# Patient Record
Sex: MALE
[De-identification: names, ages, dates, MRNs, and addresses within clinical notes are randomized per-mention and may not be internally consistent; named-entity substitution may affect disease eponyms.]

---

## 2018-04-09 ENCOUNTER — APPOINTMENT (OUTPATIENT)
Dept: SURGICAL ONCOLOGY | Facility: CLINIC | Age: 83
End: 2018-04-09
Payer: MEDICARE

## 2018-04-09 VITALS
HEIGHT: 72 IN | SYSTOLIC BLOOD PRESSURE: 139 MMHG | DIASTOLIC BLOOD PRESSURE: 68 MMHG | BODY MASS INDEX: 26.95 KG/M2 | OXYGEN SATURATION: 92 % | WEIGHT: 199 LBS | HEART RATE: 80 BPM

## 2018-04-09 DIAGNOSIS — Z86.79 PERSONAL HISTORY OF OTHER DISEASES OF THE CIRCULATORY SYSTEM: ICD-10-CM

## 2018-04-09 DIAGNOSIS — Z85.828 PERSONAL HISTORY OF OTHER MALIGNANT NEOPLASM OF SKIN: ICD-10-CM

## 2018-04-09 DIAGNOSIS — Z87.438 PERSONAL HISTORY OF OTHER DISEASES OF MALE GENITAL ORGANS: ICD-10-CM

## 2018-04-09 DIAGNOSIS — C44.729 SQUAMOUS CELL CARCINOMA OF SKIN OF LEFT LOWER LIMB, INCLUDING HIP: ICD-10-CM

## 2018-04-09 DIAGNOSIS — Z87.442 PERSONAL HISTORY OF URINARY CALCULI: ICD-10-CM

## 2018-04-09 DIAGNOSIS — Z87.19 PERSONAL HISTORY OF OTHER DISEASES OF THE DIGESTIVE SYSTEM: ICD-10-CM

## 2018-04-09 DIAGNOSIS — Z95.1 PRESENCE OF AORTOCORONARY BYPASS GRAFT: ICD-10-CM

## 2018-04-09 PROCEDURE — 99204 OFFICE O/P NEW MOD 45 MIN: CPT

## 2018-04-13 ENCOUNTER — RESULT REVIEW (OUTPATIENT)
Age: 83
End: 2018-04-13

## 2018-05-03 ENCOUNTER — OUTPATIENT (OUTPATIENT)
Dept: OUTPATIENT SERVICES | Facility: HOSPITAL | Age: 83
LOS: 1 days | End: 2018-05-03
Payer: MEDICARE

## 2018-05-03 VITALS
OXYGEN SATURATION: 95 % | HEART RATE: 80 BPM | WEIGHT: 195.11 LBS | HEIGHT: 68 IN | TEMPERATURE: 97 F | SYSTOLIC BLOOD PRESSURE: 104 MMHG | RESPIRATION RATE: 16 BRPM | DIASTOLIC BLOOD PRESSURE: 56 MMHG

## 2018-05-03 DIAGNOSIS — C44.92 SQUAMOUS CELL CARCINOMA OF SKIN, UNSPECIFIED: ICD-10-CM

## 2018-05-03 DIAGNOSIS — C44.729 SQUAMOUS CELL CARCINOMA OF SKIN OF LEFT LOWER LIMB, INCLUDING HIP: ICD-10-CM

## 2018-05-03 DIAGNOSIS — Z95.0 PRESENCE OF CARDIAC PACEMAKER: Chronic | ICD-10-CM

## 2018-05-03 DIAGNOSIS — Z98.890 OTHER SPECIFIED POSTPROCEDURAL STATES: Chronic | ICD-10-CM

## 2018-05-03 DIAGNOSIS — Z95.810 PRESENCE OF AUTOMATIC (IMPLANTABLE) CARDIAC DEFIBRILLATOR: ICD-10-CM

## 2018-05-03 DIAGNOSIS — I10 ESSENTIAL (PRIMARY) HYPERTENSION: ICD-10-CM

## 2018-05-03 DIAGNOSIS — Z88.2 ALLERGY STATUS TO SULFONAMIDES: ICD-10-CM

## 2018-05-03 DIAGNOSIS — Z91.040 LATEX ALLERGY STATUS: ICD-10-CM

## 2018-05-03 DIAGNOSIS — Z95.1 PRESENCE OF AORTOCORONARY BYPASS GRAFT: Chronic | ICD-10-CM

## 2018-05-03 DIAGNOSIS — Z95.5 PRESENCE OF CORONARY ANGIOPLASTY IMPLANT AND GRAFT: Chronic | ICD-10-CM

## 2018-05-03 DIAGNOSIS — E11.9 TYPE 2 DIABETES MELLITUS WITHOUT COMPLICATIONS: ICD-10-CM

## 2018-05-03 DIAGNOSIS — Z95.810 PRESENCE OF AUTOMATIC (IMPLANTABLE) CARDIAC DEFIBRILLATOR: Chronic | ICD-10-CM

## 2018-05-03 DIAGNOSIS — I25.10 ATHEROSCLEROTIC HEART DISEASE OF NATIVE CORONARY ARTERY WITHOUT ANGINA PECTORIS: ICD-10-CM

## 2018-05-03 LAB
ALBUMIN SERPL ELPH-MCNC: 3.9 G/DL — SIGNIFICANT CHANGE UP (ref 3.3–5)
ALP SERPL-CCNC: 67 U/L — SIGNIFICANT CHANGE UP (ref 40–120)
ALT FLD-CCNC: 8 U/L — SIGNIFICANT CHANGE UP (ref 4–41)
AST SERPL-CCNC: 16 U/L — SIGNIFICANT CHANGE UP (ref 4–40)
BILIRUB SERPL-MCNC: 0.5 MG/DL — SIGNIFICANT CHANGE UP (ref 0.2–1.2)
BLD GP AB SCN SERPL QL: NEGATIVE — SIGNIFICANT CHANGE UP
BUN SERPL-MCNC: 25 MG/DL — HIGH (ref 7–23)
CALCIUM SERPL-MCNC: 9 MG/DL — SIGNIFICANT CHANGE UP (ref 8.4–10.5)
CHLORIDE SERPL-SCNC: 100 MMOL/L — SIGNIFICANT CHANGE UP (ref 98–107)
CO2 SERPL-SCNC: 28 MMOL/L — SIGNIFICANT CHANGE UP (ref 22–31)
CREAT SERPL-MCNC: 1.08 MG/DL — SIGNIFICANT CHANGE UP (ref 0.5–1.3)
GLUCOSE SERPL-MCNC: 98 MG/DL — SIGNIFICANT CHANGE UP (ref 70–99)
HBA1C BLD-MCNC: 5.4 % — SIGNIFICANT CHANGE UP (ref 4–5.6)
HCT VFR BLD CALC: 42.2 % — SIGNIFICANT CHANGE UP (ref 39–50)
HGB BLD-MCNC: 13.3 G/DL — SIGNIFICANT CHANGE UP (ref 13–17)
LDH SERPL L TO P-CCNC: 184 U/L — SIGNIFICANT CHANGE UP (ref 135–225)
MCHC RBC-ENTMCNC: 31.5 % — LOW (ref 32–36)
MCHC RBC-ENTMCNC: 33 PG — SIGNIFICANT CHANGE UP (ref 27–34)
MCV RBC AUTO: 104.7 FL — HIGH (ref 80–100)
NRBC # FLD: 0 — SIGNIFICANT CHANGE UP
PLATELET # BLD AUTO: 191 K/UL — SIGNIFICANT CHANGE UP (ref 150–400)
PMV BLD: 11.1 FL — SIGNIFICANT CHANGE UP (ref 7–13)
POTASSIUM SERPL-MCNC: 3.9 MMOL/L — SIGNIFICANT CHANGE UP (ref 3.5–5.3)
POTASSIUM SERPL-SCNC: 3.9 MMOL/L — SIGNIFICANT CHANGE UP (ref 3.5–5.3)
PROT SERPL-MCNC: 7.3 G/DL — SIGNIFICANT CHANGE UP (ref 6–8.3)
RBC # BLD: 4.03 M/UL — LOW (ref 4.2–5.8)
RBC # FLD: 14 % — SIGNIFICANT CHANGE UP (ref 10.3–14.5)
RH IG SCN BLD-IMP: POSITIVE — SIGNIFICANT CHANGE UP
SODIUM SERPL-SCNC: 142 MMOL/L — SIGNIFICANT CHANGE UP (ref 135–145)
WBC # BLD: 7 K/UL — SIGNIFICANT CHANGE UP (ref 3.8–10.5)
WBC # FLD AUTO: 7 K/UL — SIGNIFICANT CHANGE UP (ref 3.8–10.5)

## 2018-05-03 PROCEDURE — 71046 X-RAY EXAM CHEST 2 VIEWS: CPT | Mod: 26

## 2018-05-03 PROCEDURE — 93010 ELECTROCARDIOGRAM REPORT: CPT

## 2018-05-03 RX ORDER — SODIUM CHLORIDE 9 MG/ML
1000 INJECTION, SOLUTION INTRAVENOUS
Qty: 0 | Refills: 0 | Status: DISCONTINUED | OUTPATIENT
Start: 2018-05-15 | End: 2018-05-30

## 2018-05-03 NOTE — H&P PST ADULT - HISTORY OF PRESENT ILLNESS
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 Pt s/p biopsy requiring further treatment. 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

## 2018-05-03 NOTE — H&P PST ADULT - NEGATIVE GENERAL GENITOURINARY SYMPTOMS
no dysuria/no incontinence/normal urinary frequency/no hematuria/no renal colic/no bladder infections

## 2018-05-03 NOTE — H&P PST ADULT - ITE SK HX ROS MEA POS PC
dryness/squamous cell carcinoma to left thigh, dryness/change in size/color of mole/squamous cell carcinoma to left thigh,

## 2018-05-03 NOTE — H&P PST ADULT - PROBLEM SELECTOR PLAN 5
Pt does not have card. Called cardiology office to obtain information. Asked to mail card to patient. OR booking notified of AICD.

## 2018-05-03 NOTE — H&P PST ADULT - PMH
CAD (coronary atherosclerotic disease)    Calculus of ureter  s/p cystoscopy lithotripsy 2015  HTN (hypertension)    Hyperlipidemia    Prostate cancer  s/p surgery  Squamous cell carcinoma  left thigh  Type 2 diabetes mellitus

## 2018-05-03 NOTE — H&P PST ADULT - PROBLEM SELECTOR PLAN 2
stented coronary artery, does not have card. Instructed to continue aspirin stented coronary artery, does not have card. Instructed to continue aspirin.

## 2018-05-03 NOTE — H&P PST ADULT - RS GEN PE MLT RESP DETAILS PC
no wheezes/respirations non-labored/airway patent/no rhonchi/clear to auscultation bilaterally/no intercostal retractions/good air movement/breath sounds equal/no rales

## 2018-05-03 NOTE — H&P PST ADULT - PROBLEM SELECTOR PLAN 4
instructed to avoid Tradjenta on the morning of procedure instructed to avoid Tradjenta on the morning of procedure. OR booking notified of Hx of DM type 2

## 2018-05-03 NOTE — H&P PST ADULT - SKIN
detailed exam warm and dry/scattered to forearms and lower legs, skin dry flaky/macules/color normal

## 2018-05-03 NOTE — H&P PST ADULT - NEGATIVE NEUROLOGICAL SYMPTOMS
no weakness/no generalized seizures/no focal seizures/no syncope/no paresthesias/no transient paralysis/no headache/no tremors/no vertigo/no loss of sensation

## 2018-05-03 NOTE — H&P PST ADULT - NEGATIVE OPHTHALMOLOGIC SYMPTOMS
no photophobia/no blurred vision R/no pain L/no loss of vision L/no blurred vision L/no diplopia/no loss of vision R/no pain R

## 2018-05-03 NOTE — H&P PST ADULT - ATTENDING COMMENTS
84-year-old man with squamous cell carcinoma of the left thigh scheduled for appropriate resection, with reconstruction by Dr. Phan.    Diagnosis and planned treatment were reviewed with him in the office and again on the day of operation.    All questions answered.    Consent on chart

## 2018-05-03 NOTE — H&P PST ADULT - MUSCULOSKELETAL
details… detailed exam ROM intact/no joint warmth/normal strength/no calf tenderness/no joint swelling/no joint erythema

## 2018-05-03 NOTE — H&P PST ADULT - NEGATIVE GASTROINTESTINAL SYMPTOMS
no melena/no constipation/no change in bowel habits/no nausea/no vomiting/no diarrhea/no abdominal pain

## 2018-05-03 NOTE — H&P PST ADULT - NEGATIVE GENERAL SYMPTOMS
no malaise/no fatigue/no weight gain/no polyphagia/no polyuria/no polydipsia/no fever/no chills/no sweating/no anorexia/no weight loss

## 2018-05-03 NOTE — H&P PST ADULT - NEGATIVE CARDIOVASCULAR SYMPTOMS
no orthopnea/no palpitations/no dyspnea on exertion/no peripheral edema/no paroxysmal nocturnal dyspnea/no claudication/no chest pain

## 2018-05-03 NOTE — H&P PST ADULT - PSH
AICD (automatic cardioverter/defibrillator) present  Biotronic AICD Diana 7HFTQP  Serial number [id number]  Date 9/25/2017  History of lung surgery  left, for "mass", 2010  History of tonsillectomy  at age 14  Personal history of skin cancer  Excision of left ear skin cancer  S/P CABG x 4  2009  Status post placement of cardiac pacemaker  It was removed  Stented coronary artery  2009,  a month after CABG AICD (automatic cardioverter/defibrillator) present  Biotronic AICD Diana 7HFTQP  Serial number [id number]  Date 9/25/2017  H/O cystoscopy  right lithotripsy 2015  History of lung surgery  left, for "mass", 2010  History of tonsillectomy  at age 14  Personal history of skin cancer  Excision of left ear skin cancer  S/P CABG x 4  2009  Status post placement of cardiac pacemaker  It was removed  Stented coronary artery  2009,  a month after CABG

## 2018-05-03 NOTE — H&P PST ADULT - PROBLEM SELECTOR PLAN 1
Pt is scheduled for wide excision of squamous cell carcinoma of left thigh, primary closure of left thigh wound, possible local flap possible skin graft for 5/15/18. Preop instructions, pepcid provided. Pt stated understanding. LETICIA precautions, OR booking notified. Pt is scheduled for wide excision of squamous cell carcinoma of left thigh, primary closure of left thigh wound, possible local flap possible skin graft for 5/15/18. Preop instructions, pepcid provided. Pt stated understanding. LETICIA precautions, OR booking notified. Pending medical evaluation due to Hx of DM type 2. Pending cardiac evaluation due to Hx of CAD AICD s/p cabg, s/p cardiac stent. Pt has appts set up. Pending last echo and stress test.

## 2018-05-03 NOTE — H&P PST ADULT - NEGATIVE ENMT SYMPTOMS
no tinnitus/no sinus symptoms/no nose bleeds/no hearing difficulty/no ear pain/no throat pain/no vertigo/no dysphagia

## 2018-05-14 ENCOUNTER — TRANSCRIPTION ENCOUNTER (OUTPATIENT)
Age: 83
End: 2018-05-14

## 2018-05-14 NOTE — ASU DISCHARGE PLAN (ADULT/PEDIATRIC). - ACTIVITY LEVEL
no heavy lifting/no exercise/elevate extremity/no sports/gym no exercise/no weight bearing/no heavy lifting/Try not to bed the left leg at the knee please/no sports/gym/elevate extremity

## 2018-05-14 NOTE — ASU DISCHARGE PLAN (ADULT/PEDIATRIC). - NURSING INSTRUCTIONS
Stable for discharge  Do not take pain medication on an empty stomach.  Increase fluids and fiber in diet to prevent constipation.  You were given tyelnol in the operating room, so you may not take any tyelnol product until _____9: 10 PM______

## 2018-05-14 NOTE — BRIEF OPERATIVE NOTE - PROCEDURE
<<-----Click on this checkbox to enter Procedure Excision  05/14/2018  Left eye squamous cell carcinoma with reconstruction by Dr. Phan  Active  MBEG

## 2018-05-14 NOTE — ASU DISCHARGE PLAN (ADULT/PEDIATRIC). - SPECIAL INSTRUCTIONS
Hold ASA for 48hrs please, may restart on POD#3  Keep dressing in place, do not remove  Keep dressing dry  Try to avoid excessive bending of the let lower extremity, Keep leg elevated and straight please  No showers for 1 week, Sponge bath is ok

## 2018-05-14 NOTE — ASU DISCHARGE PLAN (ADULT/PEDIATRIC). - ITEMS TO FOLLOWUP WITH YOUR PHYSICIAN'S
Follow up with plastics is in the next week or 2.    Dr. Miguel should call with pathology report by May 29 Follow up with plastics in 1 week (Call the office to make an appointment [phone])    Dr. Miguel should call with pathology report by May 29

## 2018-05-14 NOTE — ASU DISCHARGE PLAN (ADULT/PEDIATRIC). - NOTIFY
Numbness, color, or temperature change to extremity/Unable to Urinate/Pain not relieved by Medications/Fever greater than 101/Persistent Nausea and Vomiting/Numbness, tingling/Increased Irritability or Sluggishness/Bleeding that does not stop/Excessive Diarrhea/Inability to Tolerate Liquids or Foods

## 2018-05-14 NOTE — ASU DISCHARGE PLAN (ADULT/PEDIATRIC). - MEDICATION SUMMARY - MEDICATIONS TO TAKE
I will START or STAY ON the medications listed below when I get home from the hospital:    Percocet 5/325 oral tablet  -- 1 or 2 tab(s) by mouth every 6 hours, As Needed MDD:8 tabs    -- Caution federal law prohibits the transfer of this drug to any person other  than the person for whom it was prescribed.  May cause drowsiness.  Alcohol may intensify this effect.  Use care when operating dangerous machinery.  This prescription cannot be refilled.  This product contains acetaminophen.  Do not use  with any other product containing acetaminophen to prevent possible liver damage.  Using more of this medication than prescribed may cause serious breathing problems.    -- Indication: For Pain    ramipril 5 mg oral capsule  -- 1 cap(s) by mouth once a day  -- Indication: For Home med    Tradjenta 5 mg oral tablet  -- 1 tab(s) by mouth once a day  -- Indication: For Home med    simvastatin 20 mg oral tablet  -- 1 tab(s) by mouth once a day (at bedtime)  -- Indication: For Home med    atenolol 25 mg oral tablet  -- 1 tab(s) by mouth once a day  -- Indication: For Home med

## 2018-05-14 NOTE — ASU DISCHARGE PLAN (ADULT/PEDIATRIC). - YOU WERE IN THE HOSPITAL FOR:
Resection squamous cancer left thigh with reconstruction by Dr. Phan Resection squamous cancer left thigh with reconstruction by Dr. Phan, (Local flap of left Anterior Thigh)

## 2018-05-14 NOTE — ASU PATIENT PROFILE, ADULT - PSH
AICD (automatic cardioverter/defibrillator) present  Biotronic AICD Diana 7HFTQP  Serial number [id number]  Date 9/25/2017  H/O cystoscopy  right lithotripsy 2015  History of lung surgery  left, for "mass", 2010  History of tonsillectomy  at age 14  Personal history of skin cancer  Excision of left ear skin cancer  S/P CABG x 4  2009  Status post placement of cardiac pacemaker  It was removed  Stented coronary artery  2009,  a month after CABG

## 2018-05-15 ENCOUNTER — OUTPATIENT (OUTPATIENT)
Dept: OUTPATIENT SERVICES | Facility: HOSPITAL | Age: 83
LOS: 1 days | Discharge: HOME | End: 2018-05-15
Payer: MEDICARE

## 2018-05-15 ENCOUNTER — APPOINTMENT (OUTPATIENT)
Dept: SURGICAL ONCOLOGY | Facility: HOSPITAL | Age: 83
End: 2018-05-15

## 2018-05-15 ENCOUNTER — RESULT REVIEW (OUTPATIENT)
Age: 83
End: 2018-05-15

## 2018-05-15 VITALS
DIASTOLIC BLOOD PRESSURE: 52 MMHG | SYSTOLIC BLOOD PRESSURE: 114 MMHG | RESPIRATION RATE: 18 BRPM | HEART RATE: 80 BPM | TEMPERATURE: 96 F | OXYGEN SATURATION: 98 %

## 2018-05-15 VITALS
WEIGHT: 195.11 LBS | HEIGHT: 68 IN | TEMPERATURE: 98 F | OXYGEN SATURATION: 99 % | HEART RATE: 80 BPM | SYSTOLIC BLOOD PRESSURE: 112 MMHG | RESPIRATION RATE: 16 BRPM | DIASTOLIC BLOOD PRESSURE: 52 MMHG

## 2018-05-15 DIAGNOSIS — Z98.890 OTHER SPECIFIED POSTPROCEDURAL STATES: Chronic | ICD-10-CM

## 2018-05-15 DIAGNOSIS — C44.729 SQUAMOUS CELL CARCINOMA OF SKIN OF LEFT LOWER LIMB, INCLUDING HIP: ICD-10-CM

## 2018-05-15 DIAGNOSIS — Z95.1 PRESENCE OF AORTOCORONARY BYPASS GRAFT: Chronic | ICD-10-CM

## 2018-05-15 DIAGNOSIS — Z95.0 PRESENCE OF CARDIAC PACEMAKER: Chronic | ICD-10-CM

## 2018-05-15 DIAGNOSIS — Z95.5 PRESENCE OF CORONARY ANGIOPLASTY IMPLANT AND GRAFT: Chronic | ICD-10-CM

## 2018-05-15 DIAGNOSIS — Z95.810 PRESENCE OF AUTOMATIC (IMPLANTABLE) CARDIAC DEFIBRILLATOR: Chronic | ICD-10-CM

## 2018-05-15 LAB — GLUCOSE BLDC GLUCOMTR-MCNC: 97 MG/DL — SIGNIFICANT CHANGE UP (ref 70–99)

## 2018-05-15 PROCEDURE — 88305 TISSUE EXAM BY PATHOLOGIST: CPT | Mod: 26

## 2018-05-15 PROCEDURE — 93281 PM DEVICE PROGR EVAL MULTI: CPT | Mod: 26

## 2018-05-15 PROCEDURE — 11606 EXC TR-EXT MAL+MARG >4 CM: CPT

## 2018-05-15 RX ORDER — RAMIPRIL 5 MG
1 CAPSULE ORAL
Qty: 0 | Refills: 0 | COMMUNITY

## 2018-05-15 RX ORDER — SODIUM CHLORIDE 9 MG/ML
1000 INJECTION, SOLUTION INTRAVENOUS
Qty: 0 | Refills: 0 | Status: DISCONTINUED | OUTPATIENT
Start: 2018-05-15 | End: 2018-05-15

## 2018-05-15 RX ORDER — LINAGLIPTIN 5 MG/1
1 TABLET, FILM COATED ORAL
Qty: 0 | Refills: 0 | COMMUNITY

## 2018-05-15 RX ORDER — SIMVASTATIN 20 MG/1
1 TABLET, FILM COATED ORAL
Qty: 0 | Refills: 0 | COMMUNITY

## 2018-05-15 RX ORDER — ATENOLOL 25 MG/1
1 TABLET ORAL
Qty: 0 | Refills: 0 | COMMUNITY

## 2018-05-15 RX ORDER — ASPIRIN/CALCIUM CARB/MAGNESIUM 324 MG
1 TABLET ORAL
Qty: 0 | Refills: 0 | COMMUNITY

## 2018-05-15 NOTE — BRIEF OPERATIVE NOTE - PROCEDURE
<<-----Click on this checkbox to enter Procedure Complex closure of wound  05/15/2018    Active  [id number]

## 2018-05-15 NOTE — ASU PREOP CHECKLIST - 1.
[facility] from

## 2018-05-15 NOTE — CHART NOTE - NSCHARTNOTEFT_GEN_A_CORE
ELECTROPHYSIOLOGY    Device Interrogation Performed                                  Date/Time:     5/15/2018    11:45am  :          [employer]                              Model:      Itevia  CRT-D                           Mode:                   DDD/BiV                                    Rate:     80/120     Atrial Lead:  P wave amplitude:       2.0                   mv            Impedance                 476                      Ohms  Threshold                                          V @                ms      Ventricular Lead: RV  R wave amplitude       11.5                    mv  Impedance                   462                    Ohms  Threshold                                         V @                 ms      Ventricular Lead: LV  amplitude                  11.8                       mv  Impedance               1257                        Ohms  Threshold                                         V @                ms                                  Battery Status:                     Good                    Underlying Rhythm:       Normal sinus rhythm 60-65 b/min    Events/Observation:       None     Impression/Plan:  Normal ICD function.   Normal sensing and pacing via iterative testing. Good battery status. Excellent threshold capture.  No reprogramming necessary for the OR.  May use a magnet to deactivate therapies if using cautery.

## 2021-05-20 NOTE — H&P PST ADULT - GASTROINTESTINAL DETAILS
20-May-2021 14:23
no masses palpable/bowel sounds normal/no distention/no rebound tenderness/nontender/soft